# Patient Record
Sex: FEMALE | Race: WHITE | NOT HISPANIC OR LATINO | Employment: OTHER | URBAN - METROPOLITAN AREA
[De-identification: names, ages, dates, MRNs, and addresses within clinical notes are randomized per-mention and may not be internally consistent; named-entity substitution may affect disease eponyms.]

---

## 2019-10-16 PROBLEM — N18.6 ESRD ON HEMODIALYSIS: Status: ACTIVE | Noted: 2019-10-16

## 2019-10-16 PROBLEM — Z99.2 ESRD ON HEMODIALYSIS: Status: ACTIVE | Noted: 2019-10-16

## 2021-06-24 ENCOUNTER — TELEPHONE (OUTPATIENT)
Dept: FAMILY MEDICINE CLINIC | Facility: CLINIC | Age: 61
End: 2021-06-24

## 2021-06-24 NOTE — TELEPHONE ENCOUNTER
Caller: Linda Smith    Relationship: Self    Best call back number: [phone]    What orders are you requesting (i.e. lab or imaging): ORDERS FOR A MAMMOGRAM AND SHE WOULD LIKE A PAP SMEAR     In what timeframe would the patient need to come in: ASAP    Where will you receive your lab/imaging services: DID NOT SPECIFY     Additional notes:     PATIENT IS A DIALYSIS PATIENT- PATIENT IS ON THE LIST FOR A KIDNEY TRANSPLANT AND THEY'RE NEEDING UPDATED TESTS ON PATIENT.

## 2023-05-30 ENCOUNTER — HOSPITAL ENCOUNTER (OUTPATIENT)
Dept: MAMMOGRAPHY | Facility: HOSPITAL | Age: 63
Discharge: HOME | End: 2023-05-30
Admitting: FAMILY MEDICINE

## 2023-05-30 DIAGNOSIS — Z12.31 ENCOUNTER FOR SCREENING MAMMOGRAM FOR MALIGNANT NEOPLASM OF BREAST: ICD-10-CM

## 2023-05-30 PROCEDURE — 77063 BREAST TOMOSYNTHESIS BI: CPT

## 2023-05-30 PROCEDURE — 77067 SCR MAMMO BI INCL CAD: CPT

## 2023-06-13 ENCOUNTER — OFFICE VISIT (OUTPATIENT)
Dept: OBSTETRICS AND GYNECOLOGY | Age: 63
End: 2023-06-13
Payer: MEDICARE

## 2023-06-13 VITALS
BODY MASS INDEX: 30.91 KG/M2 | HEIGHT: 62 IN | DIASTOLIC BLOOD PRESSURE: 72 MMHG | WEIGHT: 168 LBS | SYSTOLIC BLOOD PRESSURE: 108 MMHG

## 2023-06-13 DIAGNOSIS — B97.7 HPV IN FEMALE: Primary | ICD-10-CM

## 2023-06-13 NOTE — PROGRESS NOTES
PROCEDURE NOTE   Linda Weems is being see for a Diagnosis of  NILM + HR HPV x 2 years.   She is nearly one year out from renal transplant due to failure 2/2 vasculitis. She is on cellcept. She has hx of abnormal paps perhaps 15 yrs ago and seems to have had leep at that time. Had not had a pap for a while, perhaps 3-5 yrs ago around the time renal failure started.    No LMP recorded (lmp unknown). Patient is postmenopausal. Menopause yes  Pregnant no  Gardasil none received    COLPOSCOPIC PROCEDURE    5% Acetic Acid was used to prepare the cervix for examination. The cervix does have a post-LEEP appearance but is not completely stenotic. There is atrophy, no AWE. An ECC was perormed.    The total number of biopsies were  0 .   The total  number of specimen containers were 1  The procedure was well tolerated. Instructions on vaginal rest and possibly bleeding were discussed as well as follow up for results.    Likely will just need yearly pap, needs yearly due to long term immunosuppression    Amy Yancey MD  6/13/2023  14:33 EDT

## 2023-06-21 LAB
DX ICD CODE: NORMAL
DX ICD CODE: NORMAL
PATH REPORT.FINAL DX SPEC: NORMAL
PATH REPORT.GROSS SPEC: NORMAL
PATH REPORT.SITE OF ORIGIN SPEC: NORMAL
PATHOLOGIST NAME: NORMAL
PAYMENT PROCEDURE: NORMAL

## 2024-03-21 ENCOUNTER — OFFICE VISIT (OUTPATIENT)
Dept: FAMILY MEDICINE CLINIC | Facility: CLINIC | Age: 64
End: 2024-03-21
Payer: MEDICARE

## 2024-03-21 VITALS
DIASTOLIC BLOOD PRESSURE: 70 MMHG | SYSTOLIC BLOOD PRESSURE: 114 MMHG | WEIGHT: 184.8 LBS | HEART RATE: 74 BPM | HEIGHT: 62 IN | OXYGEN SATURATION: 100 % | TEMPERATURE: 98.2 F | BODY MASS INDEX: 34.01 KG/M2

## 2024-03-21 DIAGNOSIS — E66.09 CLASS 1 OBESITY DUE TO EXCESS CALORIES WITH SERIOUS COMORBIDITY AND BODY MASS INDEX (BMI) OF 33.0 TO 33.9 IN ADULT: ICD-10-CM

## 2024-03-21 DIAGNOSIS — J30.9 ALLERGIC RHINITIS, UNSPECIFIED SEASONALITY, UNSPECIFIED TRIGGER: Primary | ICD-10-CM

## 2024-03-21 PROCEDURE — 1159F MED LIST DOCD IN RCRD: CPT | Performed by: NURSE PRACTITIONER

## 2024-03-21 PROCEDURE — 3078F DIAST BP <80 MM HG: CPT | Performed by: NURSE PRACTITIONER

## 2024-03-21 PROCEDURE — 3074F SYST BP LT 130 MM HG: CPT | Performed by: NURSE PRACTITIONER

## 2024-03-21 PROCEDURE — 99213 OFFICE O/P EST LOW 20 MIN: CPT | Performed by: NURSE PRACTITIONER

## 2024-03-21 PROCEDURE — 1160F RVW MEDS BY RX/DR IN RCRD: CPT | Performed by: NURSE PRACTITIONER

## 2024-03-21 RX ORDER — IPRATROPIUM BROMIDE 21 UG/1
2 SPRAY, METERED NASAL EVERY 12 HOURS
Qty: 30 ML | Refills: 5 | Status: SHIPPED | OUTPATIENT
Start: 2024-03-21

## 2024-03-21 RX ORDER — TACROLIMUS 1 MG/1
CAPSULE ORAL
COMMUNITY
Start: 2023-10-23

## 2024-03-21 RX ORDER — CALCITRIOL 0.5 UG/1
0.5 CAPSULE, LIQUID FILLED ORAL DAILY
COMMUNITY
Start: 2024-01-31 | End: 2025-01-30

## 2024-03-21 RX ORDER — LEVOCETIRIZINE DIHYDROCHLORIDE 5 MG/1
5 TABLET, FILM COATED ORAL EVERY EVENING
Qty: 30 TABLET | Refills: 2 | Status: SHIPPED | OUTPATIENT
Start: 2024-03-21

## 2024-03-21 NOTE — PROGRESS NOTES
"Chief Complaint  Allergies (Covid test - ), Sinusitis, Nasal Congestion, and Weight Loss    Subjective        Linda Weems presents to [facility]  History of Present Illness    Patient is a patient of Dr. Mayra Salas.  She is here today with complaint of allergies that has been worsening for last 6 months.  Takes zyrtec and has had nasal drainage.  In last week had nasal congestion and pressure  Took covid test and it was negative.     Reports nasal congestion/discharge, sore throat, but that went away, mucus that is clear  Denies fever, ear pain/pressure, shortness of breath, nausea, vomiting, diarrhea, shortness of breath    OTC: tylenol helped, got some nasal spray otc and seemed to help.  Not sure what it was called.    Takes zyrtec daily    Wanted to also discuss weight loss.  Hasn't reached out to insurance to see if they have a weight loss coverage on medication  Said nephrology said it was ok.       Objective   Vital Signs:  /70   Pulse 74   Temp 98.2 °F (36.8 °C)   Ht 157.5 cm (62\")   Wt 83.8 kg (184 lb 12.8 oz)   SpO2 100%   BMI 33.80 kg/m²   Estimated body mass index is 33.8 kg/m² as calculated from the following:    Height as of this encounter: 157.5 cm (62\").    Weight as of this encounter: 83.8 kg (184 lb 12.8 oz).               Physical Exam  Constitutional:       Appearance: Normal appearance.   HENT:      Head: Normocephalic and atraumatic.      Right Ear: Tympanic membrane has decreased mobility.      Left Ear: Tympanic membrane has decreased mobility.      Nose: Rhinorrhea present.      Right Sinus: No maxillary sinus tenderness or frontal sinus tenderness.      Left Sinus: No maxillary sinus tenderness or frontal sinus tenderness.   Cardiovascular:      Rate and Rhythm: Normal rate and regular rhythm.      Pulses: Normal pulses.   Pulmonary:      Effort: Pulmonary effort is normal.      Breath sounds: Normal breath sounds.   Skin:     General: Skin " is warm and dry.   Neurological:      Mental Status: She is alert and oriented to person, place, and time.   Psychiatric:         Mood and Affect: Mood normal.         Behavior: Behavior normal.         Thought Content: Thought content normal.         Judgment: Judgment normal.        Result Review :                     Assessment and Plan     Diagnoses and all orders for this visit:    1. Allergic rhinitis, unspecified seasonality, unspecified trigger (Primary)    2. Class 1 obesity due to excess calories with serious comorbidity and body mass index (BMI) of 33.0 to 33.9 in adult    Other orders  -     levocetirizine (XYZAL) 5 MG tablet; Take 1 tablet by mouth Every Evening.  Dispense: 30 tablet; Refill: 2  -     ipratropium (ATROVENT) 0.03 % nasal spray; 2 sprays into the nostril(s) as directed by provider Every 12 (Twelve) Hours.  Dispense: 30 mL; Refill: 5    Will change Zyrtec to Xyzal and add on Atrovent for allergies.  If no resolution notify provider    We discussed decreasing calories and carbs in diet and adding on exercise for weight loss.  We discussed medications for weight loss and she is to check with insurance to see if any are covered.  She will let me know and we can discuss options.  We did discuss the mechanism of action of GLP-1's in general.  She verbalized understanding and would like to proceed with 1 of these if possible.         Follow Up     Return if symptoms worsen or fail to improve.  Patient was given instructions and counseling regarding her condition or for health maintenance advice. Please see specific information pulled into the AVS if appropriate.

## 2024-03-25 ENCOUNTER — TELEPHONE (OUTPATIENT)
Dept: FAMILY MEDICINE CLINIC | Facility: CLINIC | Age: 64
End: 2024-03-25

## 2024-03-25 NOTE — TELEPHONE ENCOUNTER
Please let patient know that I got her letter with request for semaglutide.  We do not prescribe compounded semaglutide.  It is not FDA regulated and at this point we will only prescribe Wegovy for obesity or Ozempic for diabetes.  I see another message from Dr. Salas because this was sent to her as her PCP.  If she is already checked on coverage for these medications unfortunately we do not have another option.

## 2024-06-11 ENCOUNTER — OFFICE VISIT (OUTPATIENT)
Dept: FAMILY MEDICINE CLINIC | Facility: CLINIC | Age: 64
End: 2024-06-11
Payer: MEDICARE

## 2024-06-11 VITALS
HEIGHT: 62 IN | HEART RATE: 65 BPM | WEIGHT: 189.8 LBS | DIASTOLIC BLOOD PRESSURE: 60 MMHG | BODY MASS INDEX: 34.93 KG/M2 | OXYGEN SATURATION: 95 % | SYSTOLIC BLOOD PRESSURE: 106 MMHG

## 2024-06-11 DIAGNOSIS — Z78.0 ENCOUNTER FOR OSTEOPOROSIS SCREENING IN ASYMPTOMATIC POSTMENOPAUSAL PATIENT: ICD-10-CM

## 2024-06-11 DIAGNOSIS — R87.810 CERVICAL HIGH RISK HUMAN PAPILLOMAVIRUS (HPV) DNA TEST POSITIVE: ICD-10-CM

## 2024-06-11 DIAGNOSIS — Z12.31 SCREENING MAMMOGRAM FOR BREAST CANCER: ICD-10-CM

## 2024-06-11 DIAGNOSIS — Z00.00 MEDICARE ANNUAL WELLNESS VISIT, SUBSEQUENT: Primary | ICD-10-CM

## 2024-06-11 DIAGNOSIS — M81.0 AGE-RELATED OSTEOPOROSIS WITHOUT CURRENT PATHOLOGICAL FRACTURE: ICD-10-CM

## 2024-06-11 DIAGNOSIS — Z79.52 CURRENT CHRONIC USE OF SYSTEMIC STEROIDS: ICD-10-CM

## 2024-06-11 DIAGNOSIS — Z13.820 ENCOUNTER FOR OSTEOPOROSIS SCREENING IN ASYMPTOMATIC POSTMENOPAUSAL PATIENT: ICD-10-CM

## 2024-06-11 DIAGNOSIS — Z12.4 SCREENING FOR CERVICAL CANCER: ICD-10-CM

## 2024-06-11 PROCEDURE — G0439 PPPS, SUBSEQ VISIT: HCPCS | Performed by: FAMILY MEDICINE

## 2024-06-11 PROCEDURE — 3078F DIAST BP <80 MM HG: CPT | Performed by: FAMILY MEDICINE

## 2024-06-11 PROCEDURE — 1159F MED LIST DOCD IN RCRD: CPT | Performed by: FAMILY MEDICINE

## 2024-06-11 PROCEDURE — 3074F SYST BP LT 130 MM HG: CPT | Performed by: FAMILY MEDICINE

## 2024-06-11 PROCEDURE — 1160F RVW MEDS BY RX/DR IN RCRD: CPT | Performed by: FAMILY MEDICINE

## 2024-06-11 RX ORDER — CINACALCET 30 MG/1
30 TABLET, FILM COATED ORAL DAILY
COMMUNITY
Start: 2024-03-29

## 2024-06-11 RX ORDER — MYCOPHENOLATE MOFETIL 250 MG/1
250 CAPSULE ORAL
COMMUNITY
Start: 2024-05-20 | End: 2024-08-18

## 2024-06-11 RX ORDER — IRON POLYSACCHARIDE COMPLEX 150 MG
150 CAPSULE ORAL
COMMUNITY
Start: 2024-05-29

## 2024-06-11 RX ORDER — ASPIRIN 325 MG
325 TABLET ORAL DAILY
COMMUNITY

## 2024-06-11 RX ORDER — PRAVASTATIN SODIUM 40 MG
TABLET ORAL
COMMUNITY
Start: 2024-04-18

## 2024-06-11 NOTE — PROGRESS NOTES
The ABCs of the Annual Wellness Visit  Subsequent Medicare Wellness Visit    Subjective    Linda Weems is a 64 y.o. female who presents for a Subsequent Medicare Wellness Visit.    The following portions of the patient's history were reviewed and   updated as appropriate: allergies, current medications, past family history, past medical history, past social history, past surgical history, and problem list.    Compared to one year ago, the patient feels her physical   health is the same.    Compared to one year ago, the patient feels her mental   health is the same.    Recent Hospitalizations:  She was not admitted to the hospital during the last year.       Current Medical Providers:  Patient Care Team:  Mayra Salas DO as PCP - General (Family Medicine)  Kadeem Cabrera MD as Consulting Physician (Nephrology)    Outpatient Medications Prior to Visit   Medication Sig Dispense Refill    aspirin 325 MG tablet Take 1 tablet by mouth Daily.      b complex-vitamin c-folic acid (NEPHRO-LURDES) 0.8 MG tablet tablet Take 1 tablet by mouth Daily.      cinacalcet (SENSIPAR) 30 MG tablet Take 1 tablet by mouth Daily.      Ferrex 150 150 MG capsule       folic acid (FOLVITE) 1 MG tablet       ipratropium (ATROVENT) 0.03 % nasal spray 2 sprays into the nostril(s) as directed by provider Every 12 (Twelve) Hours. 30 mL 5    iron polysaccharides (NIFEREX) 150 MG capsule Take 1 capsule by mouth.      multivitamin (THERAGRAN) tablet tablet Take 1 tablet by mouth Daily.      mycophenolate (CELLCEPT) 250 MG capsule Take 1 capsule by mouth.      pravastatin (PRAVACHOL) 40 MG tablet       predniSONE (DELTASONE) 5 MG tablet Take 1 tablet by mouth Daily.      tacrolimus (PROGRAF) 1 MG capsule Take  by mouth.      cinacalcet (SENSIPAR) 30 MG tablet Take 2 tablets by mouth Daily.      aspirin 81 MG EC tablet Take 1 tablet by mouth Daily. Takes 2 daily (Patient not taking: Reported on 6/11/2024)      calcitriol (ROCALTROL)  "0.5 MCG capsule Take 1 capsule by mouth Daily. (Patient not taking: Reported on 3/21/2024)      levocetirizine (XYZAL) 5 MG tablet Take 1 tablet by mouth Every Evening. 30 tablet 2    mycophenolate (CELLCEPT) 250 MG capsule Take 2 capsules by mouth.      pravastatin (PRAVACHOL) 20 MG tablet  (Patient not taking: Reported on 6/11/2024)       No facility-administered medications prior to visit.       No opioid medication identified on active medication list. I have reviewed chart for other potential  high risk medication/s and harmful drug interactions in the elderly.        Aspirin is on active medication list. Aspirin use is indicated based on review of current medical condition/s. Pros and cons of this therapy have been discussed today. Benefits of this medication outweigh potential harm.  Patient has been encouraged to continue taking this medication.  .  Per cardiology      Patient Active Problem List   Diagnosis    ESRD on hemodialysis    Acute encephalopathy    ESRD on peritoneal dialysis    Herpes zoster without complication    Hypertension    Encephalopathy, toxic    S/P mitral valve repair    ANCA-associated vasculitis    Mitral valve mass     Advance Care Planning   Advance Care Planning     Advance Directive is not on file.  ACP discussion was held with the patient during this visit. Patient does not have an advance directive, information provided.     Objective    Vitals:    06/11/24 1523   BP: 106/60   Pulse: 65   SpO2: 95%   Weight: 86.1 kg (189 lb 12.8 oz)   Height: 157.5 cm (62\")     Estimated body mass index is 34.71 kg/m² as calculated from the following:    Height as of this encounter: 157.5 cm (62\").    Weight as of this encounter: 86.1 kg (189 lb 12.8 oz).    BMI is >= 30 and <35. (Class 1 Obesity). The following options were offered after discussion;: exercise counseling/recommendations and nutrition counseling/recommendations      Does the patient have evidence of cognitive impairment? No     "      HEALTH RISK ASSESSMENT    Smoking Status:  Social History     Tobacco Use   Smoking Status Never   Smokeless Tobacco Never     Alcohol Consumption:  Social History     Substance and Sexual Activity   Alcohol Use Yes    Alcohol/week: 2.0 standard drinks of alcohol    Types: 2 Glasses of wine per week    Comment: 1 drink per week      Fall Risk Screen:    NABIL Fall Risk Assessment was completed, and patient is at MODERATE risk for falls. Assessment completed on:2024    Depression Screenin/11/2024     3:22 PM   PHQ-2/PHQ-9 Depression Screening   Little Interest or Pleasure in Doing Things 0-->not at all   Feeling Down, Depressed or Hopeless 0-->not at all   PHQ-9: Brief Depression Severity Measure Score 0       Health Habits and Functional and Cognitive Screenin/4/2024    11:42 AM   Functional & Cognitive Status   Do you have difficulty preparing food and eating? No   Do you have difficulty bathing yourself, getting dressed or grooming yourself? No   Do you have difficulty using the toilet? No   Do you have difficulty moving around from place to place? No   Do you have trouble with steps or getting out of a bed or a chair? No   Current Diet Unhealthy Diet   Dental Exam Up to date   Eye Exam Up to date   Exercise (times per week) 2 times per week   Current Exercises Include Home Exercise Program (TV, Computer, Etc.);Swimming;Walking   Do you need help using the phone?  No   Are you deaf or do you have serious difficulty hearing?  No   Do you need help to go to places out of walking distance? No   Do you need help shopping? No   Do you need help preparing meals?  No   Do you need help with housework?  No   Do you need help with laundry? No   Do you need help taking your medications? No   Do you need help managing money? No   Do you ever drive or ride in a car without wearing a seat belt? No   Have you felt unusual stress, anger or loneliness in the last month? No   Who do you live with?  Spouse   If you need help, do you have trouble finding someone available to you? No   Have you been bothered in the last four weeks by sexual problems? No   Do you have difficulty concentrating, remembering or making decisions? No       Age-appropriate Screening Schedule:  Refer to the list below for future screening recommendations based on patient's age, sex and/or medical conditions. Orders for these recommended tests are listed in the plan section. The patient has been provided with a written plan.    Health Maintenance   Topic Date Due    TDAP/TD VACCINES (1 - Tdap) Never done    ZOSTER VACCINE (1 of 2) Never done    RSV Vaccine - Adults (1 - 1-dose 60+ series) Never done    COVID-19 Vaccine (4 - 2023-24 season) 09/01/2023    DXA SCAN  09/17/2023    PAP SMEAR  05/02/2024    COLORECTAL CANCER SCREENING  07/01/2024    INFLUENZA VACCINE  08/01/2024    Pneumococcal Vaccine 0-64 (3 of 3 - PPSV23 or PCV20) 04/08/2025    MAMMOGRAM  05/30/2025    ANNUAL WELLNESS VISIT  06/11/2025    BMI FOLLOWUP  06/11/2025    HEPATITIS C SCREENING  Completed    Hepatitis B  Completed                  CMS Preventative Services Quick Reference  Risk Factors Identified During Encounter  Immunizations Discussed/Encouraged: Influenza, Prevnar 20 (Pneumococcal 20-valent conjugate), Shingrix, COVID19, and RSV (Respiratory Syncytial Virus)  Dental Screening Recommended  Vision Screening Recommended  The above risks/problems have been discussed with the patient.  Pertinent information has been shared with the patient in the After Visit Summary.  An After Visit Summary and PPPS were made available to the patient.    Follow Up:   Next Medicare Wellness visit to be scheduled in 1 year.       Additional E&M Note during same encounter follows:  Patient has multiple medical problems which are significant and separately identifiable that require additional work above and beyond the Medicare Wellness Visit.      Chief Complaint  Medicare  "Wellness-subsequent    Subjective        HPI  Linda Weems is also being seen today for osteoporosis.  In 2021 the patient was diagnosed with osteoporosis by DEXA scan.  At that time she chose to follow a healthy diet and perform daily weightbearing exercise.  She is on chronic steroids for antirejection medication for kidney transplant in June 2022.      Patient is also here today for follow-up of an abnormal Pap smear.  1 year ago the patient had a negative Pap smear but positive high-risk HPV.  She had the same results 1 year prior.  Therefore, I had referred her to GYN for Colpo by guideline recommendation but the patient failed to see GYN.  Patient denies any vaginal complaints or vaginal bleeding.  Patient is on immunosuppressants because of her kidney transplant.         Objective   Vital Signs:  /60   Pulse 65   Ht 157.5 cm (62\")   Wt 86.1 kg (189 lb 12.8 oz)   SpO2 95%   BMI 34.71 kg/m²     Physical Exam  Vitals and nursing note reviewed.   Constitutional:       Appearance: Normal appearance. She is well-developed.   HENT:      Head: Normocephalic and atraumatic.      Right Ear: External ear normal.      Left Ear: External ear normal.      Nose: Nose normal.   Eyes:      General: No scleral icterus.     Conjunctiva/sclera: Conjunctivae normal.   Cardiovascular:      Rate and Rhythm: Normal rate and regular rhythm.      Heart sounds: Normal heart sounds.   Pulmonary:      Effort: Pulmonary effort is normal.      Breath sounds: Normal breath sounds.   Genitourinary:     General: Normal vulva.      Vagina: Normal.      Cervix: Normal.   Musculoskeletal:      Cervical back: Normal range of motion and neck supple.      Right lower leg: No edema.      Left lower leg: No edema.   Lymphadenopathy:      Cervical: No cervical adenopathy.   Skin:     General: Skin is warm and dry.      Findings: No rash.   Neurological:      Mental Status: She is alert and oriented to person, place, and time. "   Psychiatric:         Mood and Affect: Mood normal.         Behavior: Behavior normal.         Thought Content: Thought content normal.         Judgment: Judgment normal.          The following data was reviewed by: Mayra Salas DO on 06/11/2024:  Common labs          12/18/2023    11:24 2/23/2024    10:38 3/14/2024    11:29   Common Labs   WBC 4.82     4.94     5.32       Hemoglobin 12.1     12.5     11.7       Hematocrit 37.8     38.7     36.2       Platelets 230     201     189          Details          This result is from an external source.                          Assessment and Plan   Diagnoses and all orders for this visit:    1. Medicare annual wellness visit, subsequent (Primary)    2. Cervical high risk human papillomavirus (HPV) DNA test positive  -     IGP, Aptima HPV, CtNg Age Gdln    3. Screening for cervical cancer  -     IGP, Aptima HPV, CtNg Age Gdln    4. Screening mammogram for breast cancer  -     Mammo Screening Digital Tomosynthesis Bilateral With CAD; Future    5. Age-related osteoporosis without current pathological fracture  -     DEXA Bone Density Axial; Future    6. Current chronic use of systemic steroids  -     DEXA Bone Density Axial; Future    7. Encounter for osteoporosis screening in asymptomatic postmenopausal patient  -     DEXA Bone Density Axial; Future      RHM.  DEXA scan order entered, mammogram order entered, repeat Pap smear today.  If Pap is normal and HPV is negative, consider repeat Pap next year instead of referral to GYN now.         Follow Up   Return in about 1 year (around 6/11/2025) for Medicare Wellness, history of abnormal pap.  Patient was given instructions and counseling regarding her condition or for health maintenance advice. Please see specific information pulled into the AVS if appropriate.

## 2024-06-15 LAB
AGE GDLN ACOG TESTING: NORMAL
CYTOLOGIST CVX/VAG CYTO: ABNORMAL
CYTOLOGY CVX/VAG DOC CYTO: ABNORMAL
CYTOLOGY CVX/VAG DOC THIN PREP: ABNORMAL
DX ICD CODE: ABNORMAL
HPV GENOTYPE REFLEX: ABNORMAL
HPV I/H RISK 4 DNA CVX QL PROBE+SIG AMP: POSITIVE
HPV16 DNA CVX QL PROBE+SIG AMP: NEGATIVE
HPV18+45 E6+E7 MRNA CVX QL NAA+PROBE: POSITIVE
Lab: ABNORMAL
Lab: ABNORMAL
OTHER STN SPEC: ABNORMAL
STAT OF ADQ CVX/VAG CYTO-IMP: ABNORMAL

## 2024-06-17 DIAGNOSIS — R87.810 CERVICAL HIGH RISK HUMAN PAPILLOMAVIRUS (HPV) DNA TEST POSITIVE: Primary | ICD-10-CM

## 2024-07-16 ENCOUNTER — OFFICE VISIT (OUTPATIENT)
Dept: OBSTETRICS AND GYNECOLOGY | Age: 64
End: 2024-07-16
Payer: MEDICARE

## 2024-07-16 VITALS
HEIGHT: 62 IN | BODY MASS INDEX: 35.66 KG/M2 | DIASTOLIC BLOOD PRESSURE: 64 MMHG | SYSTOLIC BLOOD PRESSURE: 110 MMHG | WEIGHT: 193.8 LBS

## 2024-07-16 DIAGNOSIS — B97.7 HPV IN FEMALE: Primary | ICD-10-CM

## 2024-07-16 DIAGNOSIS — D84.9 IMMUNOSUPPRESSED STATUS: ICD-10-CM

## 2024-07-16 NOTE — PROGRESS NOTES
PROCEDURE NOTE   Linda OLIVEIRA TravisSanam is being see for a Diagnosis of NILM HPV +    No LMP recorded (lmp unknown). Patient is postmenopausal. Menopause yes  Pregnant no  Gardasil not received    COLPOSCOPIC PROCEDURE    A timeout was performed and confirmed the correct patient and procedure    5% Acetic Acid was used to prepare the cervix for examination.  The cervix was somewhat flush with the vagina.  There were no significant acetowhite changes noted.  The squamocolumnar junction was able to be seen at the cervical os.  A biopsy was performed at 12:00 and Monsel solution was applied for hemostasis    The total number of biopsies were 1.   The total  number of specimen containers were 1  The procedure was well tolerated. Instructions on vaginal rest and possibly bleeding were discussed as well as follow up for results.    Amy Yancey MD  7/16/2024  10:41 EDT

## 2024-07-16 NOTE — PROGRESS NOTES
New GYN Problem    Chief Complaint   Patient presents with    Gynecologic Exam     Referral from Mayra Salas abnormal pap 2024 (-), HPV (+)       Linda Weems is a 64-year-old -0-0-1 with history of renal transplant on immunosuppressive medication who presents for consultation regarding NILM/HPV positive Pap.  She she notes that she had a history of cervical dysplasia and what sounds to be cervical conization in .  Thereafter her Paps returned to normal.  She has now had persistent HPV since .  No vaginal bleeding or other complaints        Histories  Past Medical History:   Diagnosis Date    Acute renal failure on dialysis     FRESENIUS //SAT    Allergic     Take Zyrtec    ANCA-associated vasculitis     Environmental allergies     ESRD (end stage renal disease)     ESRD on peritoneal dialysis     Heart murmur     Hiatal hernia     History of anemia     HL (hearing loss)     Hypertension     Mitral valve mass 2021    Oligouria     Osteopenia     Renal insufficiency 2018    Sleep apnea        Past Surgical History:   Procedure Laterality Date    CARDIAC SURGERY      COLONOSCOPY  2019    INSERTION HEMODIALYSIS CATHETER Right     Subclavian    INSERTION PERITONEAL DIALYSIS CATHETER N/A 2019    Procedure: INSERTION PERITONEAL DIALYSIS CATHETER LAPAROSCOPIC;  Surgeon: Mc Johnson MD;  Location: [facility];  Service: General    TONSILLECTOMY         Family History   Problem Relation Age of Onset    Heart disease Mother     Cancer Father     Dementia Father     COPD Father     Breast cancer Other         maternal great aunt    Malig Hyperthermia Neg Hx        Social History     Socioeconomic History    Marital status:      Spouse name: Alejo Herrera    Number of children: 1   Tobacco Use    Smoking status: Never    Smokeless tobacco: Never   Vaping Use    Vaping status: Never Used   Substance and Sexual Activity    Alcohol use: Yes      "Alcohol/week: 2.0 standard drinks of alcohol     Types: 2 Glasses of wine per week     Comment: 1 drink per week     Drug use: Never    Sexual activity: Yes     Partners: Male     Birth control/protection: Post-menopausal       OB History          1    Para   1    Term   1            AB        Living   1         SAB        IAB        Ectopic        Molar        Multiple        Live Births   1                Physical Exam    /64   Ht 157.5 cm (62\")   Wt 87.9 kg (193 lb 12.8 oz)   LMP  (LMP Unknown)   BMI 35.45 kg/m²     BMI: Body mass index is 35.45 kg/m².     General:   alert, appears stated age, and cooperative   Urethra and bladder: urethral meatus normal; bladder nontender to palpation;   Vulva: normal, Bartholin's, Urethra, Lostant's normal   Vagina: normal mucosa, normal discharge, atrophic   Cervix: multiparous appearance, no lesions, and somewhat flush with the vagina   Uterus: deferred   Adnexa: deferred     See colposcopy note    Assessment/Plan    Diagnoses and all orders for this visit:    1. HPV in female (Primary)  -     Reference Histopathology    2. Immunosuppressed status      Colposcopy performed today.  Overall her cervix appeared normal without significant dysplasia.  Recommend that she continue yearly Paps especially with her immunosuppressed state.  If she has high-grade dysplasia on this biopsy, would recommend conization but I think very unlikely.  If she has persistent low-grade dysplasia (JOHN-1) for more than two years consider conization.    Will follow-up with her regarding results of biopsy and further cervical cancer screening recommendations    Amy Yancey MD  2024  10:39 EDT              "

## 2024-07-19 LAB
DX ICD CODE: NORMAL
PATH REPORT.FINAL DX SPEC: NORMAL
PATH REPORT.GROSS SPEC: NORMAL
PATH REPORT.RELEVANT HX SPEC: NORMAL
PATH REPORT.SITE OF ORIGIN SPEC: NORMAL
PATHOLOGIST NAME: NORMAL
PAYMENT PROCEDURE: NORMAL

## 2024-07-23 ENCOUNTER — HOSPITAL ENCOUNTER (OUTPATIENT)
Dept: MAMMOGRAPHY | Facility: HOSPITAL | Age: 64
Discharge: HOME | End: 2024-07-23
Payer: MEDICARE

## 2024-07-23 ENCOUNTER — HOSPITAL ENCOUNTER (OUTPATIENT)
Dept: BONE DENSITY | Facility: HOSPITAL | Age: 64
Discharge: HOME | End: 2024-07-23
Payer: MEDICARE

## 2024-07-23 ENCOUNTER — APPOINTMENT (OUTPATIENT)
Dept: MAMMOGRAPHY | Facility: HOSPITAL | Age: 64
End: 2024-07-23
Payer: MEDICARE

## 2024-07-23 DIAGNOSIS — Z79.52 CURRENT CHRONIC USE OF SYSTEMIC STEROIDS: ICD-10-CM

## 2024-07-23 DIAGNOSIS — Z78.0 ENCOUNTER FOR OSTEOPOROSIS SCREENING IN ASYMPTOMATIC POSTMENOPAUSAL PATIENT: ICD-10-CM

## 2024-07-23 DIAGNOSIS — Z13.820 ENCOUNTER FOR OSTEOPOROSIS SCREENING IN ASYMPTOMATIC POSTMENOPAUSAL PATIENT: ICD-10-CM

## 2024-07-23 DIAGNOSIS — M81.0 AGE-RELATED OSTEOPOROSIS WITHOUT CURRENT PATHOLOGICAL FRACTURE: ICD-10-CM

## 2024-07-23 DIAGNOSIS — Z12.31 SCREENING MAMMOGRAM FOR BREAST CANCER: ICD-10-CM

## 2024-07-23 PROCEDURE — 77067 SCR MAMMO BI INCL CAD: CPT

## 2024-07-23 PROCEDURE — 77063 BREAST TOMOSYNTHESIS BI: CPT

## 2024-07-23 PROCEDURE — 77080 DXA BONE DENSITY AXIAL: CPT

## 2024-07-28 DIAGNOSIS — M81.0 AGE-RELATED OSTEOPOROSIS WITHOUT CURRENT PATHOLOGICAL FRACTURE: Primary | ICD-10-CM

## 2024-07-28 RX ORDER — ALENDRONATE SODIUM 70 MG/1
70 TABLET ORAL
Qty: 12 TABLET | Refills: 3 | Status: SHIPPED | OUTPATIENT
Start: 2024-07-28

## 2024-09-23 ENCOUNTER — TELEPHONE (OUTPATIENT)
Dept: FAMILY MEDICINE CLINIC | Facility: CLINIC | Age: 64
End: 2024-09-23

## 2024-09-23 RX ORDER — SCOLOPAMINE TRANSDERMAL SYSTEM 1 MG/1
1 PATCH, EXTENDED RELEASE TRANSDERMAL
Qty: 4 PATCH | Refills: 0 | Status: SHIPPED | OUTPATIENT
Start: 2024-09-23

## 2024-09-23 NOTE — TELEPHONE ENCOUNTER
Caller: Linda Weems    Relationship: Self    Best call back number: [phone] (Mobile)     What medication are you requesting: MOTION SICKNESS PATCHES     What are your current symptoms: MOTION SICKNESS, GOING ON A CRUISE     How long have you been experiencing symptoms:     Have you had these symptoms before:    [x] Yes  [] No    Have you been treated for these symptoms before:   [x] Yes  [] No    If a prescription is needed, what is your preferred pharmacy and phone number:  [facility] [id number] - [address] & [facility] [address] - [phone]  [phone]     Additional notes:

## 2025-06-03 ENCOUNTER — OFFICE VISIT (OUTPATIENT)
Dept: FAMILY MEDICINE CLINIC | Facility: CLINIC | Age: 65
End: 2025-06-03
Payer: MEDICARE

## 2025-06-03 VITALS
HEART RATE: 69 BPM | BODY MASS INDEX: 34.48 KG/M2 | WEIGHT: 187.4 LBS | OXYGEN SATURATION: 95 % | SYSTOLIC BLOOD PRESSURE: 104 MMHG | DIASTOLIC BLOOD PRESSURE: 70 MMHG | HEIGHT: 62 IN

## 2025-06-03 DIAGNOSIS — Z12.4 ENCOUNTER FOR SCREENING FOR MALIGNANT NEOPLASM OF CERVIX: ICD-10-CM

## 2025-06-03 DIAGNOSIS — M81.0 AGE-RELATED OSTEOPOROSIS WITHOUT CURRENT PATHOLOGICAL FRACTURE: ICD-10-CM

## 2025-06-03 DIAGNOSIS — Z00.00 MEDICARE ANNUAL WELLNESS VISIT, SUBSEQUENT: Primary | ICD-10-CM

## 2025-06-03 DIAGNOSIS — R87.810 CERVICAL HIGH RISK HUMAN PAPILLOMAVIRUS (HPV) DNA TEST POSITIVE: ICD-10-CM

## 2025-06-03 PROCEDURE — G0439 PPPS, SUBSEQ VISIT: HCPCS | Performed by: FAMILY MEDICINE

## 2025-06-03 PROCEDURE — 99213 OFFICE O/P EST LOW 20 MIN: CPT | Performed by: FAMILY MEDICINE

## 2025-06-03 PROCEDURE — 3074F SYST BP LT 130 MM HG: CPT | Performed by: FAMILY MEDICINE

## 2025-06-03 PROCEDURE — 3078F DIAST BP <80 MM HG: CPT | Performed by: FAMILY MEDICINE

## 2025-06-03 PROCEDURE — 1159F MED LIST DOCD IN RCRD: CPT | Performed by: FAMILY MEDICINE

## 2025-06-03 PROCEDURE — 1160F RVW MEDS BY RX/DR IN RCRD: CPT | Performed by: FAMILY MEDICINE

## 2025-06-03 RX ORDER — MYCOPHENOLATE MOFETIL 250 MG/1
250 CAPSULE ORAL
COMMUNITY
Start: 2024-09-19 | End: 2025-06-16

## 2025-06-03 RX ORDER — CALCITRIOL 0.5 UG/1
0.5 CAPSULE, LIQUID FILLED ORAL DAILY
COMMUNITY
Start: 2025-03-17 | End: 2026-03-17

## 2025-06-03 RX ORDER — ALENDRONATE SODIUM 70 MG/1
70 TABLET ORAL
Qty: 12 TABLET | Refills: 3 | Status: SHIPPED | OUTPATIENT
Start: 2025-06-03

## 2025-06-03 NOTE — PROGRESS NOTES
Subjective   The ABCs of the Annual Wellness Visit  Medicare Wellness Visit      Linda Weems is a 65 y.o. patient who presents for a Medicare Wellness Visit.    The following portions of the patient's history were reviewed and   updated as appropriate: allergies, current medications, past family history, past medical history, past social history, past surgical history, and problem list.    Compared to one year ago, the patient's physical   health is the same.  Compared to one year ago, the patient's mental   health is the same.    Recent Hospitalizations:  She was not admitted to the hospital during the last year.     Current Medical Providers:  Patient Care Team:  Mayra Salas DO as PCP - General (Family Medicine)  Kadeem Cabrera MD as Consulting Physician (Nephrology)    Outpatient Medications Prior to Visit   Medication Sig Dispense Refill    aspirin 325 MG tablet Take 1 tablet by mouth Daily.      b complex-vitamin c-folic acid (NEPHRO-LURDES) 0.8 MG tablet tablet Take 1 tablet by mouth Daily.      calcitriol (ROCALTROL) 0.5 MCG capsule Take 1 capsule by mouth Daily.      cinacalcet (SENSIPAR) 30 MG tablet Take 2 tablets by mouth Daily.      Ferrex 150 150 MG capsule       folic acid (FOLVITE) 1 MG tablet       ipratropium (ATROVENT) 0.03 % nasal spray 2 sprays into the nostril(s) as directed by provider Every 12 (Twelve) Hours. 30 mL 5    multivitamin (THERAGRAN) tablet tablet Take 1 tablet by mouth Daily.      mycophenolate (CELLCEPT) 250 MG capsule Take 1 capsule by mouth.      pravastatin (PRAVACHOL) 40 MG tablet       predniSONE (DELTASONE) 5 MG tablet Take 1 tablet by mouth Daily.      tacrolimus (PROGRAF) 1 MG capsule Take  by mouth.      alendronate (Fosamax) 70 MG tablet Take 1 tablet by mouth Every 7 (Seven) Days. 12 tablet 3    cinacalcet (SENSIPAR) 30 MG tablet Take 1 tablet by mouth Daily.      iron polysaccharides (NIFEREX) 150 MG capsule Take 1 capsule by mouth.      Scopolamine  "1 MG/3DAYS patch Place 1 patch on the skin as directed by provider Every 72 (Seventy-Two) Hours. 4 patch 0     No facility-administered medications prior to visit.     No opioid medication identified on active medication list. I have reviewed chart for other potential  high risk medication/s and harmful drug interactions in the elderly.      Aspirin is on active medication list. Aspirin use is indicated based on review of current medical condition/s. Pros and cons of this therapy have been discussed today. Benefits of this medication outweigh potential harm.  Patient has been encouraged to continue taking this medication.  .      Patient Active Problem List   Diagnosis    ESRD on hemodialysis    Acute encephalopathy    ESRD on peritoneal dialysis    Herpes zoster without complication    Hypertension    Encephalopathy, toxic    S/P mitral valve repair    ANCA-associated vasculitis    Mitral valve mass     Advance Care Planning Advance Directive is not on file.  ACP discussion was held with the patient during this visit. Patient has an advance directive (not in EMR), copy requested.            Objective   Vitals:    06/03/25 0836   BP: 104/70   Pulse: 69   SpO2: 95%   Weight: 85 kg (187 lb 6.4 oz)   Height: 157.5 cm (62\")       Estimated body mass index is 34.28 kg/m² as calculated from the following:    Height as of this encounter: 157.5 cm (62\").    Weight as of this encounter: 85 kg (187 lb 6.4 oz).                Does the patient have evidence of cognitive impairment? No                                                                                                Health  Risk Assessment    Smoking Status:  Social History     Tobacco Use   Smoking Status Never   Smokeless Tobacco Never     Alcohol Consumption:  Social History     Substance and Sexual Activity   Alcohol Use Yes    Alcohol/week: 4.0 standard drinks of alcohol    Types: 4 Glasses of wine per week    Comment: 1 drink per week        Fall Risk " Screen  STEADI Fall Risk Assessment was completed, and patient is at LOW risk for falls.Assessment completed on:6/3/2025    Depression Screening   Little interest or pleasure in doing things? Not at all   Feeling down, depressed, or hopeless? Not at all   PHQ-2 Total Score 0      Health Habits and Functional and Cognitive Screenin/27/2025    12:19 PM   Functional & Cognitive Status   Do you have difficulty preparing food and eating? No   Do you have difficulty bathing yourself, getting dressed or grooming yourself? No   Do you have difficulty using the toilet? No   Do you have difficulty moving around from place to place? No   Do you have trouble with steps or getting out of a bed or a chair? No   Current Diet Well Balanced Diet   Dental Exam Up to date   Eye Exam Up to date   Exercise (times per week) 2 times per week   Current Exercises Include House Cleaning;Light Weights;Stationary Bicycling/Spin Class;Walking   Do you need help using the phone?  No   Are you deaf or do you have serious difficulty hearing?  No   Do you need help to go to places out of walking distance? No   Do you need help shopping? No   Do you need help preparing meals?  No   Do you need help with housework?  No   Do you need help with laundry? No   Do you need help taking your medications? No   Do you need help managing money? No   Do you ever drive or ride in a car without wearing a seat belt? No   Have you felt unusual stress, anger or loneliness in the last month? No   Who do you live with? Spouse   If you need help, do you have trouble finding someone available to you? No   Have you been bothered in the last four weeks by sexual problems? No   Do you have difficulty concentrating, remembering or making decisions? No           Age-appropriate Screening Schedule:  Refer to the list below for future screening recommendations based on patient's age, sex and/or medical conditions. Orders for these recommended tests are listed in the  plan section. The patient has been provided with a written plan.    Health Maintenance List  Health Maintenance   Topic Date Due    COLORECTAL CANCER SCREENING  07/01/2024    COVID-19 Vaccine (4 - 2024-25 season) 06/01/2026 (Originally 9/1/2024)    Pneumococcal Vaccine 50+ (3 of 3 - PPSV23, PCV20 or PCV21) 06/01/2026 (Originally 4/8/2025)    TDAP/TD VACCINES (1 - Tdap) 06/01/2026 (Originally 5/15/1979)    ZOSTER VACCINE (1 of 2) 06/01/2026 (Originally 5/15/1979)    PAP SMEAR  06/11/2025    INFLUENZA VACCINE  07/01/2025    ANNUAL WELLNESS VISIT  06/03/2026    MAMMOGRAM  07/23/2026    HEPATITIS C SCREENING  Completed    Hepatitis B  Completed                                                                                                                                                CMS Preventative Services Quick Reference  Risk Factors Identified During Encounter  Immunizations Discussed/Encouraged: Influenza, Shingrix, and COVID19  Dental Screening Recommended  Vision Screening Recommended    The above risks/problems have been discussed with the patient.  Pertinent information has been shared with the patient in the After Visit Summary.  An After Visit Summary and PPPS were made available to the patient.    Follow Up:   Next Medicare Wellness visit to be scheduled in 1 year.         Additional E&M Note during same encounter follows:  Patient has additional, significant, and separately identifiable condition(s)/problem(s) that require work above and beyond the Medicare Wellness Visit     Chief Complaint  Medicare Wellness-subsequent (PAP)    Subjective   HPI  Linda is also being seen today for an annual adult preventative physical exam. Patient is also here today for follow-up of an abnormal Pap smear.  1 year ago the patient had a negative Pap smear but positive high-risk HPV.  She had the same results 2 years prior.  Therefore, I had referred her to GYN again and this time she did go and had colposcopy.  Colposcopy  "biopsy was negative so the patient was recommended to have yearly Pap smears and HPV testing.  Patient denies any vaginal complaints or vaginal bleeding.  Patient is on immunosuppressants because of her kidney transplant.  Reference Histopathology (07/16/2024 12:51)     Linda is also being seen today for for osteoporosis.  In 2021 the patient was diagnosed with osteoporosis by DEXA scan.  The last DEXA scan was June 11, 2024 and at that time she was started on Fosamax 70 mg weekly.  She was started on vitamin D by her kidney specialist recently.  She is also following a healthy diet and performing daily weightbearing exercise.  She is on chronic steroids for antirejection medication for kidney transplant in June 2022.                Objective   Vital Signs:  /70   Pulse 69   Ht 157.5 cm (62\")   Wt 85 kg (187 lb 6.4 oz)   SpO2 95%   BMI 34.28 kg/m²   Physical Exam  Vitals and nursing note reviewed.   Constitutional:       Appearance: She is well-developed.   HENT:      Head: Normocephalic and atraumatic.      Right Ear: Tympanic membrane, ear canal and external ear normal.      Left Ear: Tympanic membrane, ear canal and external ear normal.      Nose: Nose normal.   Eyes:      General: No scleral icterus.     Conjunctiva/sclera: Conjunctivae normal.   Cardiovascular:      Rate and Rhythm: Normal rate and regular rhythm.      Heart sounds: Normal heart sounds.   Pulmonary:      Effort: Pulmonary effort is normal.      Breath sounds: Normal breath sounds.   Musculoskeletal:      Cervical back: Normal range of motion and neck supple.   Lymphadenopathy:      Cervical: No cervical adenopathy.   Skin:     General: Skin is warm and dry.      Findings: No rash.   Neurological:      Mental Status: She is alert and oriented to person, place, and time.   Psychiatric:         Mood and Affect: Mood normal.         Behavior: Behavior normal.         Thought Content: Thought content normal.         Judgment: Judgment " normal.         The following data was reviewed by: Mayra Salas DO on 06/03/2025:    Common labs          6/17/2024    10:47 9/16/2024    10:33   Common Labs   WBC 5.42     5.79       Hemoglobin 12.3     12.7       Hematocrit 39.2     40.1       Platelets 241     210          Details          This result is from an external source.                    Assessment and Plan Additional age appropriate preventative wellness advice topics were discussed during today's preventative wellness exam(some topics already addressed during AWV portion of the note above):    Physical Activity: Advised cardiovascular activity 150 minutes per week as tolerated. (example brisk walk for 30 minutes, 5 days a week).     Nutrition: Discussed nutrition plan with patient. Information shared in after visit summary. Goal is for a well balanced diet to enhance overall health.     Healthy Weight: Discussed current and goal BMI with patient. Steps to attain this goal discussed. Information shared in after visit summary.     Medicare annual wellness visit, subsequent         Age-related osteoporosis without current pathological fracture    Orders:    alendronate (Fosamax) 70 MG tablet; Take 1 tablet by mouth Every 7 (Seven) Days.    Cervical high risk human papillomavirus (HPV) DNA test positive    Orders:    IGP, Aptima HPV, Rfx 16 / 18,45    Encounter for screening for malignant neoplasm of cervix    Orders:    IGP, Aptima HPV, Rfx 16 / 18,45     RHM.  Pap today.  DEXA scan next year.  Lipids performed in September and patient was started on pravastatin by her kidney specialist, kidney specialist is managing.  Vaccine recommendations discussed.    Osteoporosis.  Continue Fosamax as tolerated.  DEXA scan is due 6/2026.         Follow Up   Return in about 1 year (around 6/3/2026) for Medicare Wellness.  Patient was given instructions and counseling regarding her condition or for health maintenance advice. Please see specific information  pulled into the AVS if appropriate.

## 2025-06-06 LAB
CYTOLOGIST CVX/VAG CYTO: ABNORMAL
CYTOLOGY CVX/VAG DOC CYTO: ABNORMAL
CYTOLOGY CVX/VAG DOC THIN PREP: ABNORMAL
DX ICD CODE: ABNORMAL
HPV GENOTYPE REFLEX: ABNORMAL
HPV I/H RISK 4 DNA CVX QL PROBE+SIG AMP: POSITIVE
HPV16 DNA CVX QL PROBE+SIG AMP: NEGATIVE
HPV18+45 E6+E7 MRNA CVX QL NAA+PROBE: POSITIVE
OTHER STN SPEC: ABNORMAL
SERVICE CMNT-IMP: ABNORMAL
STAT OF ADQ CVX/VAG CYTO-IMP: ABNORMAL